# Patient Record
Sex: MALE | Race: WHITE | Employment: FULL TIME | URBAN - METROPOLITAN AREA
[De-identification: names, ages, dates, MRNs, and addresses within clinical notes are randomized per-mention and may not be internally consistent; named-entity substitution may affect disease eponyms.]

---

## 2019-04-03 ENCOUNTER — HOSPITAL ENCOUNTER (EMERGENCY)
Age: 29
Discharge: HOME | End: 2019-04-03
Attending: EMERGENCY MEDICINE

## 2019-04-03 VITALS
HEIGHT: 70 IN | SYSTOLIC BLOOD PRESSURE: 118 MMHG | BODY MASS INDEX: 22.32 KG/M2 | OXYGEN SATURATION: 100 % | DIASTOLIC BLOOD PRESSURE: 87 MMHG | TEMPERATURE: 97.7 F | HEART RATE: 73 BPM | RESPIRATION RATE: 12 BRPM | WEIGHT: 155.9 LBS

## 2019-04-03 DIAGNOSIS — N50.82 PAIN IN SCROTUM: Primary | ICD-10-CM

## 2019-04-03 PROCEDURE — 99283 EMERGENCY DEPT VISIT LOW MDM: CPT

## 2019-04-03 ASSESSMENT — PAIN DESCRIPTION - DESCRIPTORS: DESCRIPTORS: HEAVINESS

## 2019-04-03 ASSESSMENT — PAIN DESCRIPTION - FREQUENCY: FREQUENCY: CONTINUOUS

## 2019-04-03 ASSESSMENT — PAIN DESCRIPTION - PAIN TYPE: TYPE: ACUTE PAIN

## 2019-04-03 ASSESSMENT — PAIN DESCRIPTION - LOCATION: LOCATION: SCROTUM

## 2019-04-03 ASSESSMENT — PAIN SCALES - GENERAL: PAINLEVEL_OUTOF10: 4

## 2019-04-03 NOTE — ED PROVIDER NOTES
CHIEF COMPLAINT  Testicle Pain      HISTORY OF PRESENT ILLNESS  Chiara Vargas  is a 34 y.o. male who presents to the ED at via private vehicle complaining of testicular pain. She has had intermittent testicular discomfort over the course of a couple of years. Patient states his forearm up pulling or heaviness more in the posterior aspect of the scrotum. There's been no trauma to the area. Patient is very active playing various sports and occasionally will get discomfort. He denies any pain with ejaculation. He's had no testicular swelling associated with this. He's had no swelling in the groin noted be more consistent with hernia. He had renewed discomfort Saturday Sunday went away Monday came back today causing him to be more concern prompting him to come the emergency department. There are no other complaints, modifying factors or associated symptoms. Nursing notes reviewed. Past medical history:  has no past medical history on file. Past surgical history:  has no past surgical history on file. Home medications:   Prior to Admission medications    Medication Sig Start Date End Date Taking? Authorizing Provider   Loratadine (CLARITIN PO) Take by mouth   Yes Historical Provider, MD   Pseudoephedrine HCl (SUDAFED 12 HOUR PO) Take by mouth   Yes Historical Provider, MD   Oxymetazoline HCl (AFRIN NASAL SPRAY NA) by Nasal route   Yes Historical Provider, MD       Allergies   Allergen Reactions    Pcn [Penicillins] Hives       Social history:  reports that he has quit smoking. He has never used smokeless tobacco. He reports that he drinks alcohol. He reports that he has current or past drug history. Drug: Marijuana. Family history:  History reviewed. No pertinent family history.     REVIEW OF SYSTEMS  6 systems reviewed, pertinent positives per HPI otherwise noted to be negative    PHYSICAL EXAM  Vitals:    04/03/19 1750   BP: 118/87   Pulse: 73   Resp: 12   Temp: 97.7 °F (36.5 °C) SpO2: 100%       GENERAL: Patient is well-developed, well-nourished,  no acute distress. No apparent discomfort. Non toxic appearing. HEENT:  Normocephalic, atraumatic. PERRL. Conjunctiva appear normal.  External ears are normal.  MMM  NECK: Supple with normal ROM. Trachea midline  LUNGS:  Normal work of breathing. Speaking comfortably in full sentences. EXTREMITIES: 2+ distal pulses w/o edema. MUSCULOSKELETAL:  Atraumatic extremities with normal ROM grossly. No obvious bony deformities. SKIN: Warm/dry. No rashes/lesions noted. PSYCHIATRIC: Patient is alert and oriented with normal affect  NEUROLOGIC: Cranial nerves grossly intact. Moves all extremities with equal strength. No gross sensory deficits. Answers questions/follows commands appropriately. : Testicles are descended and normal position. There is no hydrocele. There is no tenderness over the epididymal tissue. There is no warmth or induration of the scrotum. There is no tenderness over the prostate area. No evidence of hernia of either side. ED COURSE/MDM  Nursing notes reviewed. Pt was given the following medications or treatments in the ED: Patient was examined and reassured that he shows no evidence of any acute abnormality in terms of cellulitis or infection of the scrotum or prostatitis. He has no evidence of testicular torsion. He does not have evidence of epididymitis. He will be referred to urology for reevaluation. Clinical Impression  Based on the presenting complaint, history, and physical exam, multiple diagnoses were considered. Exam and workup here most c/w:  1. Pain in scrotum (intermittent discomfort)        I discussed with Libertad Dawson the results of evaluation in the ED, diagnosis, care, and prognosis. The plan is to discharge to home. Patient is in agreement with plan and questions have been answered.      I also discussed with Libertad Dawson the reasons which may require a return visit and the importance of follow-up care. The patient is well-appearing, nontoxic, and improved at the time of discharge. Patient agrees to call to arrange follow-up care as directed. Sharma Cockayne understands to return immediately for worsening/change in symptoms. Patient will be started on the following medications from the ED:  New Prescriptions    No medications on file         Disposition  Pt is discharged in stable condition.     Disposition Vitals:  /87   Pulse 73   Temp 97.7 °F (36.5 °C) (Oral)   Resp 12   Ht 5' 10\" (1.778 m)   Wt 70.7 kg (155 lb 14.4 oz)   SpO2 100%   BMI 22.37 kg/m²           Adri Mead DO  04/03/19 6633

## 2019-04-03 NOTE — ED NOTES
Patient verbalized understanding of d/c instructions. Patient left the ED and instructed on follow up.           Alfredo Gaston RN  04/03/19 9351

## 2019-04-03 NOTE — ED NOTES
Patient states that he has had a heaviness in his scrotum for three days. He states that it was still having the discomfort today and got worried so he came here.        Sourav Malhotra RN  04/03/19 7962